# Patient Record
(demographics unavailable — no encounter records)

---

## 2024-11-11 NOTE — HISTORY OF PRESENT ILLNESS
[FreeTextEntry1] : 28 yr old presents to establish new care. Pt has regular periods that are 7 days long, moderately painful, and heavy. Pt is currently on 4th day of 7 for her menses and lightening up. Pt denies vaginal dryness. Pt Painful only in the beginning of menses, pt uses heating pad. Pt complains of left side pain and has an ultrasound 4-5 yrs ago that showed small fibroids. Pt is sexually active, but not for a month, and denies pain. Pt was on birth control on the past for acne from [id number]. Pt denies depression but has PMS symptoms and wants to go to therapy.  Fhx:  -High bp mother -Breast cancer on mother's side 2 individuals  Medhx: -Fatty liver, testing due to stomach problems, at ER said she had high levels.  -Denies issues w/glucose levels  SurgHx: -Appendectomy

## 2024-11-11 NOTE — HISTORY OF PRESENT ILLNESS
[FreeTextEntry1] : 28 yr old presents to establish new care. Pt has regular periods that are 7 days long, moderately painful, and heavy. Pt is currently on 4th day of 7 for her menses and lightening up. Pt denies vaginal dryness. Pt Painful only in the beginning of menses, pt uses heating pad. Pt complains of left side pain and has an ultrasound 4-5 yrs ago that showed small fibroids. Pt is sexually active, but not for a month, and denies pain. Pt was on birth control on the past for acne from [id number]. Pt denies depression but has PMS symptoms and wants to go to therapy.  Fhx:  -High bp mother -Breast cancer on mother's side 2 individuals  Medhx: -Fatty liver, testing due to stomach problems, at ER said she had high levels.  -Denies issues w/glucose levels  SurgHx: -Appendectomy

## 2024-11-11 NOTE — END OF VISIT
[FreeTextEntry3] : I, Ayaan Meyer, acted as a scribe on behalf of Dr. Alanna Miller M.D. on 11/08/2024.   All medical entries made by the scribe were at my, Dr. Alanna Miller M.D., direction and personally dictated by me on 11/08/2024. I have reviewed the chart and agree that the record accurately reflects my personal performance of the history, physical exam, assessment and plan. I have also personally directed, reviewed, and agreed with the chart.
